# Patient Record
Sex: FEMALE | Race: WHITE | URBAN - METROPOLITAN AREA
[De-identification: names, ages, dates, MRNs, and addresses within clinical notes are randomized per-mention and may not be internally consistent; named-entity substitution may affect disease eponyms.]

---

## 2021-03-26 ENCOUNTER — NEW PATIENT (OUTPATIENT)
Dept: URBAN - METROPOLITAN AREA CLINIC 44 | Facility: CLINIC | Age: 76
End: 2021-03-26
Payer: MEDICARE

## 2021-03-26 DIAGNOSIS — H25.813 COMBINED FORMS OF AGE-RELATED CATARACT, BILATERAL: Primary | ICD-10-CM

## 2021-03-26 DIAGNOSIS — H04.123 TEAR FILM INSUFFICIENCY OF BILATERAL LACRIMAL GLANDS: ICD-10-CM

## 2021-03-26 PROCEDURE — 76514 ECHO EXAM OF EYE THICKNESS: CPT | Performed by: OPTOMETRIST

## 2021-03-26 PROCEDURE — 99204 OFFICE O/P NEW MOD 45 MIN: CPT | Performed by: OPTOMETRIST

## 2021-03-26 PROCEDURE — 92133 CPTRZD OPH DX IMG PST SGM ON: CPT | Performed by: OPTOMETRIST

## 2021-03-26 ASSESSMENT — INTRAOCULAR PRESSURE
OS: 22
OD: 22

## 2021-03-26 ASSESSMENT — VISUAL ACUITY
OD: 20/25
OS: 20/50

## 2021-03-26 ASSESSMENT — KERATOMETRY
OD: 41.63
OS: 42.75

## 2021-04-12 ENCOUNTER — OFFICE VISIT (OUTPATIENT)
Dept: URBAN - METROPOLITAN AREA CLINIC 44 | Facility: CLINIC | Age: 76
End: 2021-04-12
Payer: MEDICARE

## 2021-04-12 DIAGNOSIS — H53.022 REFRACTIVE AMBLYOPIA, LEFT EYE: ICD-10-CM

## 2021-04-12 DIAGNOSIS — H35.373 PUCKERING OF MACULA, BILATERAL: ICD-10-CM

## 2021-04-12 PROCEDURE — 99204 OFFICE O/P NEW MOD 45 MIN: CPT | Performed by: OPHTHALMOLOGY

## 2021-04-12 PROCEDURE — 92025 CPTRIZED CORNEAL TOPOGRAPHY: CPT | Performed by: OPHTHALMOLOGY

## 2021-04-12 ASSESSMENT — INTRAOCULAR PRESSURE
OD: 18
OD: 18
OS: 20
OS: 20

## 2021-04-12 NOTE — IMPRESSION/PLAN
Impression: Puckering of macula, bilateral Plan: Minimal or no distortion reported per patient. Mild thickening per OCT without ME (OD>OS). PLAN: Monitor  Discussed with patient will limit the vision post cataract surgery

## 2021-04-12 NOTE — IMPRESSION/PLAN
Impression: Vitreous degeneration, bilateral Plan: Patient states vision is stable with no new floaters, flashes  or veiling. Reports they see occasional floaters which are not interfering with daily activities and vision.  PLAN: RTC if experiences increase in floaters, flashes or veiling  Discussed with patient will limit the vision post cataract surgery

## 2021-04-12 NOTE — IMPRESSION/PLAN
Impression: Refractive amblyopia, left eye: H53.022. Plan: since child gross per Pt. Pt knows OS will always be weaker than OD even after Sx.  Pt understands high astigmatism and will need glasses for BCVA after Sx.

## 2021-04-12 NOTE — IMPRESSION/PLAN
Impression: Tear film insufficiency of bilateral lacrimal glands Plan: Condition mild. Patient reports no or occasional symptoms. Clinical evaluation shows mild DED. PLAN: Warm compresses, use of oil based artificial tears 3-4 times daily.   Discussed with patient will limit the vision post cataract surgery

## 2021-04-12 NOTE — IMPRESSION/PLAN
Impression: Combined forms of age-related cataract, bilateral Plan: Discussed cataract diagnosis with the patient. Cataracts are limiting vision. Discussed risks, benefits and alternatives to surgery including but not limited to: bleeding, infection, risk of vision loss, loss of the eye, need for other surgery. Patient voiced understanding and wishes to proceed. Patient elects surgical treatment. Specialty lens options discussed and pt declines. Patient desires surgery OU, OD   first, Standard IOL  (( AIM :  -0.25  OU, DEXYCU OU,  NO LENSX,  NO ORA , NO LRI OU- AMP , Hx of Ref Amblyopia OS)) Patient understands the need for glasses after surgery for BCVA.

## 2021-04-14 ENCOUNTER — ADULT PHYSICAL (OUTPATIENT)
Dept: URBAN - METROPOLITAN AREA CLINIC 44 | Facility: CLINIC | Age: 76
End: 2021-04-14
Payer: MEDICARE

## 2021-04-14 PROCEDURE — 99213 OFFICE O/P EST LOW 20 MIN: CPT

## 2021-05-20 ENCOUNTER — OFFICE VISIT (OUTPATIENT)
Dept: URBAN - METROPOLITAN AREA CLINIC 44 | Facility: CLINIC | Age: 76
End: 2021-05-20
Payer: MEDICARE

## 2021-05-20 DIAGNOSIS — H53.032 STRABISMIC AMBLYOPIA, LEFT EYE: ICD-10-CM

## 2021-05-20 PROCEDURE — 99214 OFFICE O/P EST MOD 30 MIN: CPT | Performed by: OPTOMETRIST

## 2021-05-20 PROCEDURE — 92134 CPTRZ OPH DX IMG PST SGM RTA: CPT | Performed by: OPTOMETRIST

## 2021-05-20 ASSESSMENT — KERATOMETRY
OD: 41.50
OS: 43.13

## 2021-05-20 ASSESSMENT — INTRAOCULAR PRESSURE
OD: 16
OS: 16

## 2021-05-20 ASSESSMENT — VISUAL ACUITY
OS: 20/50
OD: 20/50

## 2021-05-20 NOTE — IMPRESSION/PLAN
Impression: Puckering of macula, bilateral: H35.373. Patient has no significant visual complaints at this time. Denies distortion. Per OCT no ME. Plan: Discussed findings with patient. Observe. Monitor at home using [employer]ler grid. RTC 12 months for complete + OCT(Mac).  Sooner if changes on grid are observed

## 2021-05-20 NOTE — IMPRESSION/PLAN
Impression: Tear film insufficiency of bilateral lacrimal glands: H04.123. Condition mild. Patient reports no or occasional symptoms. Clinical evaluation shows mild DED. Plan: PLAN: Warm compresses for 10 minutes AM (Alfredo Mask or equal). Recommend Lipid based tears to be used 4X daily. RTC if symptom's worsen.

## 2021-05-20 NOTE — IMPRESSION/PLAN
Impression: Strabismic amblyopia, left eye: H53.032. 
Per patient Hx of patching Plan: PLAN: Observe

## 2021-05-20 NOTE — IMPRESSION/PLAN
Impression: Combined forms of age-related cataract, bilateral Visually significant/symptomatic cataracts. BCVA 20/50U. Glare 20/100OD, 20/150 OS. Plan: Rediscussed cataract diagnosis with the patient. Cataracts are limiting vision. Discussed risks, benefits and alternatives to surgery including but not limited to: bleeding, infection, risk of vision loss, loss of the eye, need for other surgery. Patient voiced understanding and wishes to proceed. Patient elects surgical treatment. Specialty lens options discussed and pt declines. Patient desires surgery OU, OD   first, Standard IOL  (( AIM :  -0.25  OU, DEXYCU OU,  NO LENSX,  NO ORA , NO LRI OU- AMP , Hx of Ref Amblyopia OS)) Patient understands the need for glasses after surgery for BCVA.

## 2021-06-23 ENCOUNTER — ADULT PHYSICAL (OUTPATIENT)
Dept: URBAN - METROPOLITAN AREA CLINIC 44 | Facility: CLINIC | Age: 76
End: 2021-06-23
Payer: MEDICARE

## 2021-06-23 PROCEDURE — 99213 OFFICE O/P EST LOW 20 MIN: CPT

## 2021-07-28 ENCOUNTER — OFFICE VISIT (OUTPATIENT)
Dept: URBAN - METROPOLITAN AREA CLINIC 44 | Facility: CLINIC | Age: 76
End: 2021-07-28
Payer: MEDICARE

## 2021-07-28 DIAGNOSIS — H43.813 VITREOUS DEGENERATION, BILATERAL: ICD-10-CM

## 2021-07-28 PROCEDURE — 92134 CPTRZ OPH DX IMG PST SGM RTA: CPT | Performed by: OPTOMETRIST

## 2021-07-28 PROCEDURE — 99214 OFFICE O/P EST MOD 30 MIN: CPT | Performed by: OPTOMETRIST

## 2021-07-28 ASSESSMENT — INTRAOCULAR PRESSURE
OS: 14
OD: 14

## 2021-07-28 ASSESSMENT — KERATOMETRY: OD: 41.25

## 2021-07-28 ASSESSMENT — VISUAL ACUITY
OD: 20/50
OS: 20/50

## 2021-07-28 NOTE — IMPRESSION/PLAN
Impression: Vitreous degeneration, bilateral Reports no new floaters, flashes  or veiling. No retinal defects noted. Patient reports occasional floaters which are not interfering with daily activities and vision. Plan: PLAN: Discussed findings and observe.  RTC STAT if experiences increase in floaters, flashes or veiling

## 2021-07-28 NOTE — IMPRESSION/PLAN
Impression: Puckering of macula, bilateral
Minimal or no distortion reported per patient. Mild thickening per OCT without ME (OD>OS).  Plan: PLAN: Monitor and RTC with Dr Ronald Velasquez in 12 months for DFE/OCT (Mac cube)

## 2021-07-28 NOTE — IMPRESSION/PLAN
Impression: Tear film insufficiency of bilateral lacrimal glands Clinical evaluation shows mild DED signs. Patient reports no or occasional symptoms not interfering with daily activities. Plan: PLAN: Warm compresses for 10 minutes AM (Alfredo Mask or equal). Recommend Lipid based tears to be used 4X daily. RTC if symptom's worsen. WDL

## 2021-07-28 NOTE — IMPRESSION/PLAN
Impression: Combined forms of age-related cataract, bilateral Visually significant/symptomatic cataracts. BCVA 20/50 OU. Glare 20/100OD, 20/150OS. Plan: Rediscussed cataract diagnosis with the patient. Discussed and reviewed treatment options for cataracts. Surgical treatment is required for cataracts. Risks and benefits of surgical treatment were discussed and understood. Patient elects surgical treatment. Recommend surgery OU, OD first. standard lens, Aim OD: plano. Aim OS: plano. Patient will need glasses for full time wear. Patient will need glasses for all near work, including computer. Patient will need glasses for distance work (if near aim).

## 2021-07-28 NOTE — IMPRESSION/PLAN
Impression: Ocular hypertension, bilateral
Stable. Mild disc asymmetry (Vertical cupping OD . 08, OS .36) with mild nasal and inferior RNFL, OS mild nasal RNFL (Averge 75 OD, OS) -Fm HX, Good  IOP OU.  Plan: PLAN: Continue care with Dr. Alma Fortune

## 2021-08-13 ENCOUNTER — ADULT PHYSICAL (OUTPATIENT)
Dept: URBAN - METROPOLITAN AREA CLINIC 44 | Facility: CLINIC | Age: 76
End: 2021-08-13
Payer: MEDICARE

## 2021-08-13 PROCEDURE — 99203 OFFICE O/P NEW LOW 30 MIN: CPT | Performed by: PHYSICIAN ASSISTANT

## 2021-08-13 RX ORDER — LOSARTAN POTASSIUM 25 MG/1
25 MG TABLET ORAL
Qty: 0 | Refills: 0 | Status: ACTIVE
Start: 2021-08-13

## 2021-08-16 ENCOUNTER — PRE-OPERATIVE VISIT (OUTPATIENT)
Dept: URBAN - METROPOLITAN AREA CLINIC 44 | Facility: CLINIC | Age: 76
End: 2021-08-16
Payer: MEDICARE

## 2021-08-16 DIAGNOSIS — H25.811 COMBINED FORMS OF AGE-RELATED CATARACT, RIGHT EYE: ICD-10-CM

## 2021-08-16 DIAGNOSIS — H40.053 OCULAR HYPERTENSION, BILATERAL: ICD-10-CM

## 2021-08-16 PROCEDURE — 92136 OPHTHALMIC BIOMETRY: CPT | Performed by: OPHTHALMOLOGY

## 2021-08-16 PROCEDURE — 99214 OFFICE O/P EST MOD 30 MIN: CPT | Performed by: OPHTHALMOLOGY

## 2021-08-16 RX ORDER — PREDNISOLONE ACETATE 10 MG/ML
1 % SUSPENSION/ DROPS OPHTHALMIC
Qty: 10 | Refills: 1 | Status: ACTIVE
Start: 2021-08-16

## 2021-08-16 RX ORDER — DICLOFENAC SODIUM 1 MG/ML
0.1 % SOLUTION/ DROPS OPHTHALMIC
Qty: 10 | Refills: 0 | Status: ACTIVE
Start: 2021-08-16

## 2021-08-16 ASSESSMENT — INTRAOCULAR PRESSURE
OD: 14
OS: 14

## 2021-08-16 NOTE — IMPRESSION/PLAN
Impression: Ocular hypertension, bilateral
Stable. Mild disc asymmetry (Vertical cupping OD . 08, OS .36) with mild nasal and inferior RNFL, OS mild nasal RNFL (Averge 75 OD, OS) -Fm HX, Good  IOP OU.  Plan: PLAN: Continue care with Dr. Jose See  Discussed with patient will limit the vision post cataract surgery

## 2021-08-16 NOTE — IMPRESSION/PLAN
Impression: Strabismic amblyopia, left eye: H53.032. 
Per patient Hx of patching Plan: PLAN: Observe  Discussed with patient will limit the vision post cataract surgery

## 2021-08-16 NOTE — IMPRESSION/PLAN
Impression: Vitreous degeneration, bilateral Reports no new floaters, flashes  or veiling. No retinal defects noted. Patient reports occasional floaters which are not interfering with daily activities and vision. Plan: Discussed rt/rd precaution. Advised patient to call us if patient has onset of new floaters and flashes of light. Monitor. Patient to call us sooner PRN onset of new visual changes.

## 2021-08-16 NOTE — IMPRESSION/PLAN
Impression: Tear film insufficiency of bilateral lacrimal glands Clinical evaluation shows mild DED signs. Patient reports no or occasional symptoms not interfering with daily activities. Plan: use artifical tears OU.   Discussed with patient will limit the vision post cataract surgery

## 2021-08-16 NOTE — IMPRESSION/PLAN
Impression: Combined forms of age-related cataract, right eye: H25.811. Plan: May proceed with 2nd eye once 1st eye is cleared.

## 2021-08-16 NOTE — IMPRESSION/PLAN
Impression: Combined forms of age-related cataract, bilateral Visually significant/symptomatic cataracts. BCVA 20/50 OU. Glare 20/100OD, 20/150OS. Plan: Discussed cataract diagnosis with the patient. Cataracts are limiting vision. Discussed risks, benefits and alternatives to surgery including but not limited to: bleeding, infection, risk of vision loss, loss of the eye, need for other surgery. Patient voiced understanding and wishes to proceed. Patient elects surgical treatment. Specialty lens options discussed and pt declines. Patient desires surgery OU, OD   first, Standard IOL  (( AIM : -0.25 OU, DEXYCU OU (+) Drops OD Only  d/t dominant eye,  NO LENSX,  NO ORA,  NO LRI OU- irr astg-  AMP )) Patient understands the need for glasses after surgery for BCVA. 
 * pt had questions about TORIC OS discussed VA potential is limited due to amblyopia*

## 2021-08-16 NOTE — IMPRESSION/PLAN
Impression: Puckering of macula, bilateral
Minimal or no distortion reported per patient. Mild thickening per OCT without ME (OD>OS). Plan: PLAN: Monitor.   Discussed with patient will limit the vision post cataract surgery

## 2021-08-25 ENCOUNTER — SURGERY (OUTPATIENT)
Dept: URBAN - METROPOLITAN AREA SURGERY 19 | Facility: SURGERY | Age: 76
End: 2021-08-25
Payer: MEDICARE

## 2021-08-25 PROCEDURE — 66984 XCAPSL CTRC RMVL W/O ECP: CPT | Performed by: OPHTHALMOLOGY

## 2021-08-26 ENCOUNTER — POST-OPERATIVE VISIT (OUTPATIENT)
Dept: URBAN - METROPOLITAN AREA CLINIC 44 | Facility: CLINIC | Age: 76
End: 2021-08-26

## 2021-08-26 PROCEDURE — 99024 POSTOP FOLLOW-UP VISIT: CPT | Performed by: OPTOMETRIST

## 2021-08-26 ASSESSMENT — INTRAOCULAR PRESSURE: OD: 26

## 2021-08-26 NOTE — IMPRESSION/PLAN
Impression: S/P Cataract Extraction by phacoemulsification with IOL placement OD - 1 Day. Encounter for surgical aftercare following surgery on a sense organ  Z48.610. Plan: Reviewed findings with patient. Continue with prescribed medications as directed. RTC 1 week for Refraction + IOP check.  . RTC sooner if any decreased vision or pain occurs

## 2021-09-01 ENCOUNTER — POST-OPERATIVE VISIT (OUTPATIENT)
Dept: URBAN - METROPOLITAN AREA CLINIC 44 | Facility: CLINIC | Age: 76
End: 2021-09-01
Payer: MEDICARE

## 2021-09-01 DIAGNOSIS — Z48.810 ENCOUNTER FOR SURGICAL AFTERCARE FOLLOWING SURGERY ON A SENSE ORGAN: Primary | ICD-10-CM

## 2021-09-01 PROCEDURE — 99024 POSTOP FOLLOW-UP VISIT: CPT | Performed by: OPTOMETRIST

## 2021-09-01 ASSESSMENT — VISUAL ACUITY: OD: 20/70

## 2021-09-01 ASSESSMENT — INTRAOCULAR PRESSURE
OD: 16
OS: 14

## 2021-09-01 NOTE — IMPRESSION/PLAN
Impression: S/P Cataract Extraction by phacoemulsification with IOL placement OD - 7 Days. Encounter for surgical aftercare following surgery on a sense organ  Z48.810. DED limits BCVA Plan: Reviewed findings with patient. Continue with prescribed medications as directed. Add Gel AT TID + PRAT. RTC as scheduled for CE/IOL 2nd eye and then 1 day S/P CE/IOL.

## 2021-09-08 ENCOUNTER — SURGERY (OUTPATIENT)
Dept: URBAN - METROPOLITAN AREA SURGERY 19 | Facility: SURGERY | Age: 76
End: 2021-09-08
Payer: MEDICARE

## 2021-09-08 DIAGNOSIS — H25.812 COMBINED FORMS OF AGE-RELATED CATARACT, LEFT EYE: Primary | ICD-10-CM

## 2021-09-08 PROCEDURE — 66984 XCAPSL CTRC RMVL W/O ECP: CPT | Performed by: OPHTHALMOLOGY

## 2021-09-09 ENCOUNTER — POST-OPERATIVE VISIT (OUTPATIENT)
Dept: URBAN - METROPOLITAN AREA CLINIC 44 | Facility: CLINIC | Age: 76
End: 2021-09-09

## 2021-09-09 DIAGNOSIS — Z96.1 PRESENCE OF INTRAOCULAR LENS: Primary | ICD-10-CM

## 2021-09-09 PROCEDURE — 99024 POSTOP FOLLOW-UP VISIT: CPT | Performed by: OPTOMETRIST

## 2021-09-09 ASSESSMENT — INTRAOCULAR PRESSURE: OS: 17

## 2021-10-01 ENCOUNTER — POST-OPERATIVE VISIT (OUTPATIENT)
Dept: URBAN - METROPOLITAN AREA CLINIC 44 | Facility: CLINIC | Age: 76
End: 2021-10-01

## 2021-10-01 PROCEDURE — 99024 POSTOP FOLLOW-UP VISIT: CPT | Performed by: OPTOMETRIST

## 2021-10-01 ASSESSMENT — INTRAOCULAR PRESSURE
OS: 12
OD: 12

## 2021-10-01 ASSESSMENT — VISUAL ACUITY
OD: 20/50
OS: 20/70

## 2021-10-01 NOTE — IMPRESSION/PLAN
Impression: S/P Cataract Extraction by phacoemulsification with IOL placement OS - 23 Days. Presence of intraocular lens  Z96.1. BCVA limited by ERM, PCO, DED Plan: Continue with AT. Discussed findings with patient. RTC 3-4 weeks + Mac.  Will consider YAG

## 2021-10-26 ENCOUNTER — POST-OPERATIVE VISIT (OUTPATIENT)
Dept: URBAN - METROPOLITAN AREA CLINIC 44 | Facility: CLINIC | Age: 76
End: 2021-10-26
Payer: MEDICARE

## 2021-10-26 PROCEDURE — 99024 POSTOP FOLLOW-UP VISIT: CPT | Performed by: OPTOMETRIST

## 2021-10-26 ASSESSMENT — VISUAL ACUITY
OS: 20/60
OD: 20/40

## 2021-10-26 ASSESSMENT — INTRAOCULAR PRESSURE
OS: 16
OD: 16

## 2021-10-26 NOTE — IMPRESSION/PLAN
Impression: S/P Cataract Extraction by phacoemulsification with IOL placement OS - 48 Days. Presence of intraocular lens  Z96.1. PCO OU with BCVA below expectations. Hx of EMR which may also be affecting BCVA. No CME Plan: Discussed findings. Rec YAG OS and OD if indicated.  Discussed with patient ERM maybe a factor and if no improvement with BCVA consider retinal consult

## 2021-11-12 ENCOUNTER — ADULT PHYSICAL (OUTPATIENT)
Dept: URBAN - METROPOLITAN AREA CLINIC 44 | Facility: CLINIC | Age: 76
End: 2021-11-12
Payer: MEDICARE

## 2021-11-12 DIAGNOSIS — H26.493 OTHER SECONDARY CATARACT, BILATERAL: ICD-10-CM

## 2021-11-12 PROCEDURE — 99213 OFFICE O/P EST LOW 20 MIN: CPT | Performed by: PHYSICIAN ASSISTANT

## 2021-12-10 ENCOUNTER — ADULT PHYSICAL (OUTPATIENT)
Dept: URBAN - METROPOLITAN AREA CLINIC 44 | Facility: CLINIC | Age: 76
End: 2021-12-10
Payer: MEDICARE

## 2021-12-10 DIAGNOSIS — Z01.818 ENCOUNTER FOR OTHER PREPROCEDURAL EXAMINATION: Primary | ICD-10-CM

## 2021-12-10 PROCEDURE — 99213 OFFICE O/P EST LOW 20 MIN: CPT | Performed by: PHYSICIAN ASSISTANT

## 2021-12-29 ENCOUNTER — SURGERY (OUTPATIENT)
Dept: URBAN - METROPOLITAN AREA SURGERY 19 | Facility: SURGERY | Age: 76
End: 2021-12-29
Payer: MEDICARE

## 2021-12-29 PROCEDURE — 66821 AFTER CATARACT LASER SURGERY: CPT | Performed by: OPHTHALMOLOGY

## 2022-01-12 ENCOUNTER — SURGERY (OUTPATIENT)
Dept: URBAN - METROPOLITAN AREA SURGERY 19 | Facility: SURGERY | Age: 77
End: 2022-01-12
Payer: MEDICARE

## 2022-01-12 PROCEDURE — 66821 AFTER CATARACT LASER SURGERY: CPT | Performed by: OPHTHALMOLOGY

## 2022-01-31 ENCOUNTER — POST-OPERATIVE VISIT (OUTPATIENT)
Dept: URBAN - METROPOLITAN AREA CLINIC 44 | Facility: CLINIC | Age: 77
End: 2022-01-31
Payer: MEDICARE

## 2022-01-31 DIAGNOSIS — H52.4 PRESBYOPIA: ICD-10-CM

## 2022-01-31 PROCEDURE — 99024 POSTOP FOLLOW-UP VISIT: CPT | Performed by: OPTOMETRIST

## 2022-01-31 ASSESSMENT — VISUAL ACUITY
OS: 20/60
OD: 20/30

## 2022-01-31 ASSESSMENT — INTRAOCULAR PRESSURE
OS: 26
OD: 18

## 2022-01-31 NOTE — IMPRESSION/PLAN
Impression: S/P YAG Capsulotomy (Yttrium Aluminum [place]) OS - 33 Days. Presence of intraocular lens  Z96.1. Plan: Doing well. OS amblyopic Rec impact resistant materials.  RTC 6 months for complete + RNFL
